# Patient Record
Sex: FEMALE | Race: WHITE | NOT HISPANIC OR LATINO | URBAN - METROPOLITAN AREA
[De-identification: names, ages, dates, MRNs, and addresses within clinical notes are randomized per-mention and may not be internally consistent; named-entity substitution may affect disease eponyms.]

---

## 2017-02-03 ENCOUNTER — OFFICE (OUTPATIENT)
Dept: URBAN - METROPOLITAN AREA CLINIC 11 | Facility: CLINIC | Age: 57
End: 2017-02-03

## 2017-02-03 VITALS
HEART RATE: 79 BPM | WEIGHT: 134 LBS | DIASTOLIC BLOOD PRESSURE: 85 MMHG | SYSTOLIC BLOOD PRESSURE: 130 MMHG | HEIGHT: 66 IN

## 2017-02-03 DIAGNOSIS — R49.0 DYSPHONIA: ICD-10-CM

## 2017-02-03 DIAGNOSIS — R13.11 DYSPHAGIA, ORAL PHASE: ICD-10-CM

## 2017-02-03 DIAGNOSIS — R13.19 OTHER DYSPHAGIA: ICD-10-CM

## 2017-02-03 PROCEDURE — 99204 OFFICE O/P NEW MOD 45 MIN: CPT | Performed by: INTERNAL MEDICINE

## 2017-02-03 RX ORDER — RABEPRAZOLE SODIUM 20 MG/1
TABLET, DELAYED RELEASE ORAL
Qty: 90 | Refills: 3 | Status: COMPLETED
End: 2020-06-19

## 2017-02-03 RX ORDER — POLYETHYLENE GLYCOL 3350, SODIUM SULFATE, SODIUM CHLORIDE, POTASSIUM CHLORIDE, ASCORBIC ACID, SODIUM ASCORBATE 7.5-2.691G
KIT ORAL
Qty: 1 | Refills: 0 | Status: COMPLETED
Start: 2017-02-03 | End: 2017-02-08

## 2017-02-03 NOTE — SERVICEHPINOTES
She has a history of esophageal rings.  She sated taht she has been having issues with choking and dyshpagia.  She stated taht she can feel choked on water and foods.  She has to clear her throat continually.  Foods stop in the upper chest and she may have symptoms of throat clearing for about 30 minutes after eating.   She stated that this was better after a dilation about 10 years ago.  She had been on PPIs including Dexilant wtihout improvement.  She has been on Aciphex which has helped.  She has noted issues with hoarseness. During a prior evaluation she did have barium studies but vomited with them.  She stated she has done them twice.  She has been seen in by ENT, Dr. Conde, in [place], [place].She has been on a gluten free diet due to her fibromyalgia.  This has helped with her pain control.    She has asthma which is controlled duane Xopenex, Qvar, and singular.  She had a colonoscopy in [place], [place] at the age of 46.

## 2017-02-08 ENCOUNTER — OFFICE (OUTPATIENT)
Dept: URBAN - METROPOLITAN AREA CLINIC 11 | Facility: CLINIC | Age: 57
End: 2017-02-08

## 2017-02-08 ENCOUNTER — AMBULATORY SURGICAL CENTER (OUTPATIENT)
Dept: URBAN - METROPOLITAN AREA SURGERY 3 | Facility: SURGERY | Age: 57
End: 2017-02-08

## 2017-02-08 ENCOUNTER — AMBULATORY SURGICAL CENTER (OUTPATIENT)
Dept: URBAN - METROPOLITAN AREA SURGERY 3 | Facility: SURGERY | Age: 57
End: 2017-02-08
Payer: COMMERCIAL

## 2017-02-08 VITALS
DIASTOLIC BLOOD PRESSURE: 85 MMHG | TEMPERATURE: 97.7 F | DIASTOLIC BLOOD PRESSURE: 78 MMHG | HEART RATE: 76 BPM | OXYGEN SATURATION: 96 % | SYSTOLIC BLOOD PRESSURE: 114 MMHG | SYSTOLIC BLOOD PRESSURE: 125 MMHG | SYSTOLIC BLOOD PRESSURE: 120 MMHG | OXYGEN SATURATION: 97 % | OXYGEN SATURATION: 100 % | OXYGEN SATURATION: 98 % | TEMPERATURE: 98.4 F | OXYGEN SATURATION: 97 % | HEART RATE: 80 BPM | WEIGHT: 130 LBS | DIASTOLIC BLOOD PRESSURE: 75 MMHG | HEART RATE: 61 BPM | HEIGHT: 66 IN | TEMPERATURE: 97.7 F | SYSTOLIC BLOOD PRESSURE: 131 MMHG | HEART RATE: 74 BPM | DIASTOLIC BLOOD PRESSURE: 78 MMHG | DIASTOLIC BLOOD PRESSURE: 70 MMHG | DIASTOLIC BLOOD PRESSURE: 85 MMHG | RESPIRATION RATE: 18 BRPM | SYSTOLIC BLOOD PRESSURE: 110 MMHG | SYSTOLIC BLOOD PRESSURE: 131 MMHG | RESPIRATION RATE: 17 BRPM | OXYGEN SATURATION: 100 % | DIASTOLIC BLOOD PRESSURE: 75 MMHG | OXYGEN SATURATION: 98 % | SYSTOLIC BLOOD PRESSURE: 125 MMHG | RESPIRATION RATE: 25 BRPM | DIASTOLIC BLOOD PRESSURE: 81 MMHG | DIASTOLIC BLOOD PRESSURE: 70 MMHG | SYSTOLIC BLOOD PRESSURE: 128 MMHG | OXYGEN SATURATION: 99 % | OXYGEN SATURATION: 96 % | DIASTOLIC BLOOD PRESSURE: 84 MMHG | SYSTOLIC BLOOD PRESSURE: 120 MMHG | HEART RATE: 61 BPM | RESPIRATION RATE: 25 BRPM | DIASTOLIC BLOOD PRESSURE: 81 MMHG | SYSTOLIC BLOOD PRESSURE: 110 MMHG | TEMPERATURE: 98.4 F | OXYGEN SATURATION: 95 % | HEIGHT: 66 IN | HEART RATE: 80 BPM | WEIGHT: 130 LBS | OXYGEN SATURATION: 95 % | RESPIRATION RATE: 17 BRPM | SYSTOLIC BLOOD PRESSURE: 114 MMHG | SYSTOLIC BLOOD PRESSURE: 128 MMHG | RESPIRATION RATE: 18 BRPM | HEART RATE: 74 BPM | DIASTOLIC BLOOD PRESSURE: 84 MMHG | HEART RATE: 76 BPM | OXYGEN SATURATION: 99 %

## 2017-02-08 DIAGNOSIS — K31.7 POLYP OF STOMACH AND DUODENUM: ICD-10-CM

## 2017-02-08 DIAGNOSIS — K63.5 POLYP OF COLON: ICD-10-CM

## 2017-02-08 DIAGNOSIS — Z12.11 ENCOUNTER FOR SCREENING FOR MALIGNANT NEOPLASM OF COLON: ICD-10-CM

## 2017-02-08 DIAGNOSIS — K22.2 ESOPHAGEAL OBSTRUCTION: ICD-10-CM

## 2017-02-08 DIAGNOSIS — R13.19 OTHER DYSPHAGIA: ICD-10-CM

## 2017-02-08 PROBLEM — D12.5 BENIGN NEOPLASM OF SIGMOID COLON: Status: ACTIVE | Noted: 2017-02-08

## 2017-02-08 PROCEDURE — 45380 COLONOSCOPY AND BIOPSY: CPT | Mod: 33 | Performed by: INTERNAL MEDICINE

## 2017-02-08 PROCEDURE — 88313 SPECIAL STAINS GROUP 2: CPT | Performed by: INTERNAL MEDICINE

## 2017-02-08 PROCEDURE — 88342 IMHCHEM/IMCYTCHM 1ST ANTB: CPT | Performed by: INTERNAL MEDICINE

## 2017-02-08 PROCEDURE — 43239 EGD BIOPSY SINGLE/MULTIPLE: CPT | Mod: 51 | Performed by: INTERNAL MEDICINE

## 2017-02-08 PROCEDURE — 43450 DILATE ESOPHAGUS 1/MULT PASS: CPT | Mod: 51 | Performed by: INTERNAL MEDICINE

## 2017-02-08 PROCEDURE — 88305 TISSUE EXAM BY PATHOLOGIST: CPT | Performed by: INTERNAL MEDICINE

## 2017-02-28 ENCOUNTER — OFFICE (OUTPATIENT)
Dept: URBAN - METROPOLITAN AREA CLINIC 11 | Facility: CLINIC | Age: 57
End: 2017-02-28

## 2017-02-28 VITALS
WEIGHT: 129 LBS | HEIGHT: 66 IN | DIASTOLIC BLOOD PRESSURE: 78 MMHG | RESPIRATION RATE: 16 BRPM | HEART RATE: 69 BPM | SYSTOLIC BLOOD PRESSURE: 131 MMHG

## 2017-02-28 DIAGNOSIS — R49.0 DYSPHONIA: ICD-10-CM

## 2017-02-28 DIAGNOSIS — K21.9 GASTRO-ESOPHAGEAL REFLUX DISEASE WITHOUT ESOPHAGITIS: ICD-10-CM

## 2017-02-28 DIAGNOSIS — R13.19 OTHER DYSPHAGIA: ICD-10-CM

## 2017-02-28 PROCEDURE — 99213 OFFICE O/P EST LOW 20 MIN: CPT | Performed by: INTERNAL MEDICINE

## 2017-02-28 NOTE — SERVICEHPINOTES
She stated that overall she was better.  She has been eating well but stated that she still has some issues with eating hamburgers.  She has had reflux if she eats tomato basil soup and bends over.  Otherwise her reflux has improved and she has not been having the heartburn. She has continued to have a sinus drainage at night which prompts the cough. She has a staccato throat clearing during the day, which almost seems habitual.  She does, however, has asthma, which se thinks is well controlled but it could also cause this type of issue.She has not seen ENT yet about the cough/sinus issues, and nodule on her neck. She had a ring on her EGD which was dialted.

## 2017-02-28 NOTE — SERVICENOTES
She has had some improvement with the aciphex and after dilation of the ring.  We discussed the throat clearing and the potential relationship to her reflux, ENT/sinus issues, asthma, and potential habitual issues.  I would like to continue the Aciphex BID for 3 mos prior to dropping the dose, to see if the sx improve.  I would have her f/u with ENT for evaluation.  If she continues to have difficulty with meats, I would like an esophagram prior to a repeat EGD.  We discussed the gasric polyps and relationship to her reflux meds.

## 2017-11-17 ENCOUNTER — OFFICE (OUTPATIENT)
Dept: URBAN - METROPOLITAN AREA CLINIC 11 | Facility: CLINIC | Age: 57
End: 2017-11-17

## 2017-11-17 VITALS
HEIGHT: 66 IN | DIASTOLIC BLOOD PRESSURE: 83 MMHG | SYSTOLIC BLOOD PRESSURE: 129 MMHG | WEIGHT: 127 LBS | HEART RATE: 61 BPM

## 2017-11-17 DIAGNOSIS — K21.9 GASTRO-ESOPHAGEAL REFLUX DISEASE WITHOUT ESOPHAGITIS: ICD-10-CM

## 2017-11-17 DIAGNOSIS — J45.909 UNSPECIFIED ASTHMA, UNCOMPLICATED: ICD-10-CM

## 2017-11-17 PROCEDURE — 99213 OFFICE O/P EST LOW 20 MIN: CPT | Performed by: INTERNAL MEDICINE

## 2017-11-17 RX ORDER — RABEPRAZOLE SODIUM 20 MG/1
TABLET, DELAYED RELEASE ORAL
Qty: 90 | Refills: 3 | Status: COMPLETED
End: 2020-06-19

## 2017-11-17 NOTE — SERVICENOTES
We discussed the longterm use of the PPIs, her continued need for PPIs with her rings/GERD/Asthma, and need to be seen sooner if she has dysphagia or worsening sx.  We reviewed and discussed the gastric fundic gland polyps.

## 2017-11-17 NOTE — SERVICEHPINOTES
She presents for f/u of her GERD and rings.  She has been on the Aciphex and limits her diet.  She has not had dysphagia.  She has reported good control of her reflux on Aciphex once daily (down from BID dosing).  She had seen ENT about the posterior pharyngx and it was normal. She has not had abdominal pain.  She has a history of fundic gland polyps and is not able to tolerated the step down of meds off of the PPIs due to GERD/rings/Asthma.  She has not had issues with her Asthma of late. She has recently moved to [place], [place].

## 2018-11-19 ENCOUNTER — OFFICE (OUTPATIENT)
Dept: URBAN - METROPOLITAN AREA CLINIC 11 | Facility: CLINIC | Age: 58
End: 2018-11-19

## 2018-11-19 VITALS
HEIGHT: 66 IN | SYSTOLIC BLOOD PRESSURE: 130 MMHG | DIASTOLIC BLOOD PRESSURE: 83 MMHG | HEART RATE: 65 BPM | WEIGHT: 132 LBS

## 2018-11-19 DIAGNOSIS — R13.19 OTHER DYSPHAGIA: ICD-10-CM

## 2018-11-19 DIAGNOSIS — K21.9 GASTRO-ESOPHAGEAL REFLUX DISEASE WITHOUT ESOPHAGITIS: ICD-10-CM

## 2018-11-19 PROCEDURE — 99213 OFFICE O/P EST LOW 20 MIN: CPT | Performed by: INTERNAL MEDICINE

## 2018-11-19 RX ORDER — FAMOTIDINE 40 MG/1
40 TABLET, FILM COATED ORAL
Qty: 90 | Refills: 3 | Status: COMPLETED
Start: 2018-11-19 | End: 2020-06-19

## 2018-11-19 RX ORDER — RABEPRAZOLE SODIUM 20 MG/1
TABLET, DELAYED RELEASE ORAL
Qty: 90 | Refills: 3 | Status: COMPLETED
End: 2020-06-19

## 2018-11-19 NOTE — SERVICENOTES
She has had breakthrough of her GERD on her meds and some O/P type dysphagia symptoms.  I would add the Pepcid at night, continue the Aciphex, and order the MBS/esopahgram (?recurrent rings or aspiration risks) as we discussed.

## 2018-11-19 NOTE — SERVICEHPINOTES
She presents for f/u of her GERD and hx of rings.  She has beenon her meds and thought that she was doing ok even though she has had some breakthrough heartburn and occasional dysphaiga.  She has been on her meds consistently.  She has noted some issues with eating meats and a sensation of choking.  They are not sticking or stopping in the mid chest.  She has had some coughing with the episodes.  This may also occur with liquids.  "I feel great other than that."  She really seems to try to avoid discussion of having heatlh care difficulties. She has been on Aciphex.

## 2019-05-20 ENCOUNTER — OFFICE (OUTPATIENT)
Dept: URBAN - METROPOLITAN AREA CLINIC 11 | Facility: CLINIC | Age: 59
End: 2019-05-20

## 2019-05-20 VITALS
HEART RATE: 63 BPM | SYSTOLIC BLOOD PRESSURE: 134 MMHG | HEIGHT: 66 IN | WEIGHT: 122 LBS | DIASTOLIC BLOOD PRESSURE: 82 MMHG

## 2019-05-20 DIAGNOSIS — K21.9 GASTRO-ESOPHAGEAL REFLUX DISEASE WITHOUT ESOPHAGITIS: ICD-10-CM

## 2019-05-20 DIAGNOSIS — K31.7 POLYP OF STOMACH AND DUODENUM: ICD-10-CM

## 2019-05-20 PROCEDURE — 99213 OFFICE O/P EST LOW 20 MIN: CPT | Performed by: INTERNAL MEDICINE

## 2019-05-20 NOTE — SERVICENOTES
She has been doing well and on the lower dosing of the Aciphex.  We discussed her fundic gland polyps, benign nature, and risks of growth over time.  With her hx of rings, if/when she has recurrent issues, we can do a f/u EGD then.  We discussed the long term use of the PPIs/risks.

## 2019-05-20 NOTE — SERVICEHPINOTES
She presents for f/u of her GERD.  She has been on the Aciphex and has stopped her pepcid.  She has not had further issues with dysphagia. She has not had breakthrough reflux/heartburn on her meds.  Tania avery not had n/v.  Tania avery been working with a lower caffiene diet and avoid cokes. Her esopahgram was normal in the Fall.  She had an EGD in 2017 when she had a ring and fundic gland polyp.  FONT style="BACKGROUND-COLOR: #[id number]"She has not had lower GI issues. /FONT

## 2020-06-19 ENCOUNTER — OFFICE (OUTPATIENT)
Dept: URBAN - METROPOLITAN AREA CLINIC 11 | Facility: CLINIC | Age: 60
End: 2020-06-19

## 2020-06-19 VITALS
DIASTOLIC BLOOD PRESSURE: 78 MMHG | HEIGHT: 66 IN | HEART RATE: 61 BPM | SYSTOLIC BLOOD PRESSURE: 125 MMHG | WEIGHT: 112 LBS

## 2020-06-19 DIAGNOSIS — K21.9 GASTRO-ESOPHAGEAL REFLUX DISEASE WITHOUT ESOPHAGITIS: ICD-10-CM

## 2020-06-19 PROCEDURE — 99213 OFFICE O/P EST LOW 20 MIN: CPT | Performed by: INTERNAL MEDICINE

## 2020-06-19 NOTE — SERVICEHPINOTES
"I'm doing fine."  She presents for f/u of her GERD.  She had started atrial off of the Aciphex and has done well. She has a hx of GERD and rings with a dialtion in 2017.  She has not had difficulties with heartburn, noctural regurgitation, no dysphagia, abdominal pain or other new issues. She has not needed prn antacids.  She has made diet changes which has worked well. With her reflux hx, she has had asthma.  Even this has been under good control and she has not needed meds in about 2 years. She had her last colonoscopy in 2017.  She had a hyperplastic polyp then.  She has a hx of fundic gland polyps.

## 2020-06-19 NOTE — SERVICENOTES
She has been doing quite well and has not had issues with her GERD, recurrent rings, or her asthma. I would continue her off of meds and have her f/u as needed.  She is due for her f/u colon in 2027.